# Patient Record
Sex: MALE | Race: WHITE | NOT HISPANIC OR LATINO | Employment: FULL TIME | URBAN - METROPOLITAN AREA
[De-identification: names, ages, dates, MRNs, and addresses within clinical notes are randomized per-mention and may not be internally consistent; named-entity substitution may affect disease eponyms.]

---

## 2022-01-01 ENCOUNTER — HOSPITAL ENCOUNTER (EMERGENCY)
Facility: CLINIC | Age: 52
Discharge: HOME | End: 2022-01-01
Attending: EMERGENCY MEDICINE | Admitting: EMERGENCY MEDICINE
Payer: COMMERCIAL

## 2022-01-01 ENCOUNTER — APPOINTMENT (OUTPATIENT)
Dept: GENERAL RADIOLOGY | Facility: CLINIC | Age: 52
End: 2022-01-01
Attending: EMERGENCY MEDICINE
Payer: COMMERCIAL

## 2022-01-01 VITALS
WEIGHT: 171.96 LBS | HEART RATE: 80 BPM | RESPIRATION RATE: 20 BRPM | OXYGEN SATURATION: 100 % | SYSTOLIC BLOOD PRESSURE: 136 MMHG | DIASTOLIC BLOOD PRESSURE: 82 MMHG | TEMPERATURE: 98.1 F | HEIGHT: 69 IN | BODY MASS INDEX: 25.47 KG/M2

## 2022-01-01 DIAGNOSIS — S93.491A SPRAIN OF ANTERIOR TALOFIBULAR LIGAMENT OF RIGHT ANKLE, INITIAL ENCOUNTER: ICD-10-CM

## 2022-01-01 PROCEDURE — 99283 EMERGENCY DEPT VISIT LOW MDM: CPT | Performed by: EMERGENCY MEDICINE

## 2022-01-01 PROCEDURE — 99283 EMERGENCY DEPT VISIT LOW MDM: CPT

## 2022-01-01 PROCEDURE — 73610 X-RAY EXAM OF ANKLE: CPT | Mod: 26 | Performed by: RADIOLOGY

## 2022-01-01 PROCEDURE — 73610 X-RAY EXAM OF ANKLE: CPT | Mod: RT

## 2022-01-01 RX ORDER — IBUPROFEN 200 MG
600 TABLET ORAL 3 TIMES DAILY
Qty: 45 TABLET | Refills: 0 | Status: SHIPPED | OUTPATIENT
Start: 2022-01-01 | End: 2022-01-06

## 2022-01-01 ASSESSMENT — ENCOUNTER SYMPTOMS
HEADACHES: 0
BACK PAIN: 0

## 2022-01-01 ASSESSMENT — MIFFLIN-ST. JEOR: SCORE: 1624.99

## 2022-01-01 NOTE — ED PROVIDER NOTES
"    [place] EMERGENCY DEPARTMENT ([facility])  1/01/22  History     Chief Complaint   Patient presents with     Ankle Pain     The history is provided by the patient and medical records.     Marck Orantes is a 51 year old male who states that he fell down some steps landing on his right foot and twisting his right ankle.  Patient states he had immediate pain and had difficulty weightbearing on his ankle and presents the ER with complaints of swelling and pain in the lateral aspect of his ankle.  Patient denies any head neck or back injury.    This part of the medical record was transcribed by Ethel Chan, Medical Scribe, from a dictation done by Micheal Gonsalez MD.     No past medical history on file.    No past surgical history on file.    No family history on file.    Social History     Tobacco Use     Smoking status: Never Smoker     Smokeless tobacco: Never Used   Substance Use Topics     Alcohol use: Never        Allergies   Allergen Reactions     Lidocaine         Review of your medicines      None.       I have reviewed the Medications, Allergies, Past Medical and Surgical History, and Social History in the Epic system.    Review of Systems   Musculoskeletal: Negative for back pain.        Positive for R ankle pain and swelling   Neurological: Negative for headaches.   All other systems reviewed and are negative.    A complete review of systems was performed with pertinent positives and negatives noted in the HPI, and all other systems negative.    Physical Exam   BP: 125/67  Pulse: 89  Temp: 98.1  F (36.7  C)  Resp: 15  Height: 176 cm (5' 9.29\")  Weight: 78 kg (171 lb 15.3 oz)  SpO2: 100 %      Physical Exam  Vitals and nursing note reviewed.   Constitutional:       Appearance: He is not diaphoretic.   HENT:      Head: Atraumatic.   Eyes:      Extraocular Movements: Extraocular movements intact.      Pupils: Pupils are equal, round, and reactive to light.   Musculoskeletal:      Cervical back: " Neck supple.      Comments: Patient has swelling and bony tenderness of the lateral malleolus of the right ankle there is no proximal tib-fib tenderness.  Distal CMS is intact.  There is no foot bony tenderness.  There is no mortise instability.   Neurological:      General: No focal deficit present.      Mental Status: He is alert and oriented to person, place, and time.   Psychiatric:         Mood and Affect: Mood normal.         ED Course     At 4:06 PM the patient was seen and examined by Micheal Gonsalez MD in Room ED09.        Procedures         Results for orders placed or performed during the hospital encounter of 01/01/22 (from the past 24 hour(s))   Ankle XR, G/E 3 views, right    Narrative    EXAM: XR ANKLE RIGHT G/E 3 VIEWS  LOCATION: [facility]  DATE/TIME: 1/1/2022 4:09 PM    INDICATION: Right ankle pain after trauma.  COMPARISON: None.      Impression    IMPRESSION: Acute nondisplaced small avulsion fracture of the inferior tip of the fibula, with fracture fragment measuring 8 mm. No other fracture. Normal joint alignment. The ankle mortise is symmetric. Normal joint spacing. Mild circumferential soft   tissue swelling throughout the right ankle, lateral greater than medial.       Assessments & Plan (with Medical Decision Making)     I have reviewed the nursing notes.    I have reviewed the findings, diagnosis, plan and need for follow up with the patient.    New Prescriptions    IBUPROFEN (ADVIL/MOTRIN) 200 MG TABLET    Take 3 tablets (600 mg) by mouth 3 times daily for 5 days       Final diagnoses:   Sprain of anterior talofibular ligament of right ankle, initial encounter - with possible small avulsion fracture of tip of fibula     Ice and elevation tonight    Wear ankle brace for 1 week to provide stabilization for ambulation    Use crutches over the next 3 days and gradually increase weightbearing as tolerated with the ankle brace on.    Please  make an appointment to follow up with Your Primary Care Provider or our Orthopedics Clinic (phone: [phone]) in 1 week for recheck as needed.      I, Ethel Chan am serving as a trained medical scribe to document services personally performed by Micheal Gonsalez MD, based on the provider's statements to me.      I, Micheal Gonsalez MD, was physically present and have reviewed and verified the accuracy of this note documented by Ethel Chan.         Micheal Gonsalez MD  1/1/2022   [facility] EMERGENCY DEPARTMENT     Micheal Gonsalez MD  01/01/22 9983

## 2022-01-01 NOTE — DISCHARGE INSTRUCTIONS
Ice and elevation tonight    Wear ankle brace for 1 week to provide stabilization for ambulation    Use crutches over the next 3 days and gradually increase weightbearing as tolerated with the ankle brace on.    Please make an appointment to follow up with Your Primary Care Provider or our Orthopedics Clinic (phone: [phone]) in 1 week for recheck as needed.

## 2022-05-13 VITALS
HEART RATE: 87 BPM | RESPIRATION RATE: 16 BRPM | BODY MASS INDEX: 24.89 KG/M2 | SYSTOLIC BLOOD PRESSURE: 155 MMHG | OXYGEN SATURATION: 100 % | WEIGHT: 170 LBS | TEMPERATURE: 98.2 F | DIASTOLIC BLOOD PRESSURE: 97 MMHG

## 2022-05-13 PROCEDURE — 99285 EMERGENCY DEPT VISIT HI MDM: CPT | Mod: 25 | Performed by: EMERGENCY MEDICINE

## 2022-05-13 PROCEDURE — 26750 TREAT FINGER FRACTURE EACH: CPT | Mod: 54 | Performed by: EMERGENCY MEDICINE

## 2022-05-13 PROCEDURE — 26750 TREAT FINGER FRACTURE EACH: CPT | Mod: F2 | Performed by: EMERGENCY MEDICINE

## 2022-05-14 ENCOUNTER — HOSPITAL ENCOUNTER (EMERGENCY)
Facility: CLINIC | Age: 52
Discharge: HOME | End: 2022-05-14
Attending: EMERGENCY MEDICINE | Admitting: EMERGENCY MEDICINE
Payer: COMMERCIAL

## 2022-05-14 ENCOUNTER — APPOINTMENT (OUTPATIENT)
Dept: GENERAL RADIOLOGY | Facility: CLINIC | Age: 52
End: 2022-05-14
Attending: EMERGENCY MEDICINE
Payer: COMMERCIAL

## 2022-05-14 DIAGNOSIS — S61.313A LACERATION OF LEFT MIDDLE FINGER WITHOUT FOREIGN BODY WITH DAMAGE TO NAIL, INITIAL ENCOUNTER: ICD-10-CM

## 2022-05-14 DIAGNOSIS — S62.639B OPEN FRACTURE OF TUFT OF DISTAL PHALANX OF FINGER: ICD-10-CM

## 2022-05-14 PROCEDURE — 73130 X-RAY EXAM OF HAND: CPT | Mod: 26 | Performed by: RADIOLOGY

## 2022-05-14 PROCEDURE — 250N000013 HC RX MED GY IP 250 OP 250 PS 637: Performed by: EMERGENCY MEDICINE

## 2022-05-14 PROCEDURE — 73130 X-RAY EXAM OF HAND: CPT | Mod: LT

## 2022-05-14 PROCEDURE — 90471 IMMUNIZATION ADMIN: CPT | Performed by: EMERGENCY MEDICINE

## 2022-05-14 PROCEDURE — 90715 TDAP VACCINE 7 YRS/> IM: CPT | Performed by: EMERGENCY MEDICINE

## 2022-05-14 PROCEDURE — 250N000011 HC RX IP 250 OP 636: Performed by: EMERGENCY MEDICINE

## 2022-05-14 PROCEDURE — 96365 THER/PROPH/DIAG IV INF INIT: CPT | Performed by: EMERGENCY MEDICINE

## 2022-05-14 PROCEDURE — 96375 TX/PRO/DX INJ NEW DRUG ADDON: CPT | Performed by: EMERGENCY MEDICINE

## 2022-05-14 PROCEDURE — 96366 THER/PROPH/DIAG IV INF ADDON: CPT | Performed by: EMERGENCY MEDICINE

## 2022-05-14 RX ORDER — BUPIVACAINE HYDROCHLORIDE 2.5 MG/ML
5 INJECTION, SOLUTION EPIDURAL; INFILTRATION; INTRACAUDAL ONCE
Status: DISCONTINUED | OUTPATIENT
Start: 2022-05-14 | End: 2022-05-14 | Stop reason: HOSPADM

## 2022-05-14 RX ORDER — BUPIVACAINE HYDROCHLORIDE 2.5 MG/ML
INJECTION, SOLUTION EPIDURAL; INFILTRATION; INTRACAUDAL
Status: DISCONTINUED
Start: 2022-05-14 | End: 2022-05-14 | Stop reason: HOSPADM

## 2022-05-14 RX ORDER — OXYCODONE HYDROCHLORIDE 5 MG/1
5 TABLET ORAL EVERY 6 HOURS PRN
Qty: 6 TABLET | Refills: 0 | Status: SHIPPED | OUTPATIENT
Start: 2022-05-14 | End: 2022-05-17

## 2022-05-14 RX ORDER — CEPHALEXIN 500 MG/1
500 CAPSULE ORAL 3 TIMES DAILY
Qty: 15 CAPSULE | Refills: 0 | Status: SHIPPED | OUTPATIENT
Start: 2022-05-14 | End: 2022-05-19

## 2022-05-14 RX ORDER — HYDROMORPHONE HYDROCHLORIDE 1 MG/ML
0.5 INJECTION, SOLUTION INTRAMUSCULAR; INTRAVENOUS; SUBCUTANEOUS ONCE
Status: COMPLETED | OUTPATIENT
Start: 2022-05-14 | End: 2022-05-14

## 2022-05-14 RX ORDER — CEFTRIAXONE 1 G/1
1 INJECTION, POWDER, FOR SOLUTION INTRAMUSCULAR; INTRAVENOUS ONCE
Status: COMPLETED | OUTPATIENT
Start: 2022-05-14 | End: 2022-05-14

## 2022-05-14 RX ORDER — OXYCODONE HYDROCHLORIDE 5 MG/1
5 TABLET ORAL ONCE
Status: COMPLETED | OUTPATIENT
Start: 2022-05-14 | End: 2022-05-14

## 2022-05-14 RX ORDER — IBUPROFEN 600 MG/1
600 TABLET, FILM COATED ORAL ONCE
Status: COMPLETED | OUTPATIENT
Start: 2022-05-14 | End: 2022-05-14

## 2022-05-14 RX ADMIN — OXYCODONE HYDROCHLORIDE 5 MG: 5 TABLET ORAL at 06:04

## 2022-05-14 RX ADMIN — CLOSTRIDIUM TETANI TOXOID ANTIGEN (FORMALDEHYDE INACTIVATED), CORYNEBACTERIUM DIPHTHERIAE TOXOID ANTIGEN (FORMALDEHYDE INACTIVATED), BORDETELLA PERTUSSIS TOXOID ANTIGEN (GLUTARALDEHYDE INACTIVATED), BORDETELLA PERTUSSIS FILAMENTOUS HEMAGGLUTININ ANTIGEN (FORMALDEHYDE INACTIVATED), BORDETELLA PERTUSSIS PERTACTIN ANTIGEN, AND BORDETELLA PERTUSSIS FIMBRIAE 2/3 ANTIGEN 0.5 ML: 5; 2; 2.5; 5; 3; 5 INJECTION, SUSPENSION INTRAMUSCULAR at 03:39

## 2022-05-14 RX ADMIN — IBUPROFEN 600 MG: 600 TABLET ORAL at 06:04

## 2022-05-14 RX ADMIN — HYDROMORPHONE HYDROCHLORIDE 0.5 MG: 1 INJECTION, SOLUTION INTRAMUSCULAR; INTRAVENOUS; SUBCUTANEOUS at 03:40

## 2022-05-14 RX ADMIN — CEFTRIAXONE 1 G: 1 INJECTION, POWDER, FOR SOLUTION INTRAMUSCULAR; INTRAVENOUS at 03:40

## 2022-05-14 ASSESSMENT — ENCOUNTER SYMPTOMS
BRUISES/BLEEDS EASILY: 0
CONFUSION: 0
VOMITING: 0
SHORTNESS OF BREATH: 0
BACK PAIN: 0
DYSURIA: 0
HEADACHES: 0
NAUSEA: 0
COUGH: 0
FEVER: 0
WEAKNESS: 0
ABDOMINAL PAIN: 0
RHINORRHEA: 0

## 2022-05-14 NOTE — DISCHARGE INSTRUCTIONS
Thank you for your patience today.  Please follow-up with your regular doctor in the next 2-3 days for further evaluation and follow-up care.  Please call to schedule an appointment.  Please follow-up with orthopedic/hand.  A referral has been placed so they should call you Monday to schedule an appointment.  If you do not hear from them please call the clinic to schedule a follow-up appointment.  [phone]    Please continue your own medications.  Please take antibiotics as directed.  Please keep wounds clean and dry.  Please return to the ER if you develop any worsening of your current symptoms.  It was a pleasure taking care of you today.  We hope you feel better soon.

## 2022-05-14 NOTE — ED PROVIDER NOTES
ED Provider Note  [facility]      History     Chief Complaint   Patient presents with     Hand Injury     HPI  aMrck Orantes is a 51 year old right-handed male who has no significant past medical history who presents to the emergency department for evaluation of a hand injury.  Patient states that approximately 10 to 15 minutes prior to arrival he was cutting wood with an electrical saw and cut his left fingers.  Patient reports that he lacerated his third digit and has some minor injuries to his second and fourth digit.  Patient reports some tingling and numbness to his left third digit.  Patient denies any other injury or complaints.  Patient is not on chronic anticoagulation.  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  Last tetanus was greater than 10 years ago.    Past Medical History  History reviewed. No pertinent past medical history.  History reviewed. No pertinent surgical history.  cephALEXin (KEFLEX) 500 MG capsule      Allergies   Allergen Reactions     Lidocaine      Family History  History reviewed. No pertinent family history.  Social History   Social History     Tobacco Use     Smoking status: Never Smoker     Smokeless tobacco: Never Used   Substance Use Topics     Alcohol use: Never     Drug use: Never      Past medical history, past surgical history, medications, allergies, family history, and social history were reviewed with the patient. No additional pertinent items.       Review of Systems   Constitutional: Negative for fever.   HENT: Negative for rhinorrhea.    Eyes: Negative for visual disturbance.   Respiratory: Negative for cough and shortness of breath.    Cardiovascular: Negative for chest pain and leg swelling.   Gastrointestinal: Negative for abdominal pain, nausea and vomiting.   Endocrine: Negative for polyuria.   Genitourinary: Negative for dysuria.    Musculoskeletal: Negative for back pain.   Skin: Negative for rash.        +hand/finger tip injury/laceration   Allergic/Immunologic: Negative for immunocompromised state.   Neurological: Negative for weakness and headaches.   Hematological: Does not bruise/bleed easily.   Psychiatric/Behavioral: Negative for confusion.     A complete review of systems was performed with pertinent positives and negatives noted in the HPI, and all other systems negative.    Physical Exam   BP: (!) 155/97  Pulse: 87  Temp: 98.2  F (36.8  C)  Resp: 16  Weight: 77.1 kg (170 lb)  SpO2: 100 %  Physical Exam  General: Afebrile, no acute distress   HEENT: Normocephalic, atraumatic, conjunctivae normal. MMM  Neck: non-tender, supple  Cardio: regular rate. regular rhythm   Resp: Normal work of breathing, no respiratory distress, lungs clear bilaterally, no wheezing, rhonchi, rales  Chest/Back: no visual signs of trauma, no CVA tenderness   Abdomen: soft, non distension, no tenderness, no peritoneal signs   Neuro: alert and fully oriented. CN II-XII grossly intact. Grossly normal strength and sensation in all extremities.   MSK:  no deformities. Normal range of motion  Integumentary/Skin: Left hand with superficial laceration: 2nd digit with superficial laceration/tear at distal aspect of nail/phalanx, 3rd digit with ~2 cm flap like laceration at distal phalanx with nailbed involvement, 4th digit with ~1 cm laceration/tear at distal phalanx, bleeding controlled   Psych: normal affect, normal behavior    ED Course      Procedures  Laceration repair:  Confirmed Patient, procedure, side, and site correct, time out taken prior to procedure  verbal consent  Laceration size and location: avulsion/flap like laceration left 3 digit ~ 2 cm  Shape: flap   Depth: Superficial  Details: Clean  Neurovascular/tendon exam: Intact  Anesthesia: None (patient allergic)  Preparation: sterile field established  Irrigation: copious, with saline  Debridement:  none  Skin Closure: Sutures/staples:  simple interrupted technique, total of 3 sutures, 5-0 prolene  Complexity: single layer  post procedure exam: circulation, motor, sensory examination intact  complications: none  patient tolerated well  performed by self  total time 30 minutes      .   Results for orders placed or performed during the hospital encounter of 05/14/22   XR Hand Left G/E 3 Views     Status: None    Narrative    EXAM: XR HAND LT G/E 3 VW  LOCATION: [facility]  DATE/TIME: 5/14/2022 12:27 AM    INDICATION: high speed saw injury to left 3rd middle 4th ring fingers  COMPARISON: None.      Impression    IMPRESSION: There is a slightly displaced comminuted fracture involving the tuft of the third finger extending to the base and there is a subtle lucency involving the base of the fourth finger tuft which likely represents a nondisplaced fracture.     Medications   bupivacaine (MARCAINE) 0.25% preservative free injection (has no administration in time range)   bupivacaine HCl (PF) (MARCAINE) 0.25 % injection (has no administration in time range)   ibuprofen (ADVIL/MOTRIN) tablet 600 mg (has no administration in time range)   oxyCODONE (ROXICODONE) tablet 5 mg (has no administration in time range)   cefTRIAXone (ROCEPHIN) 1 g vial to attach to  mL bag for ADULTS or NS 50 mL bag for PEDS (1 g Intravenous New Bag 5/14/22 0340)   Tdap (tetanus-diphtheria-acell pertussis) (ADACEL) injection 0.5 mL (0.5 mLs Intramuscular Given 5/14/22 0339)   HYDROmorphone (PF) (DILAUDID) injection 0.5 mg (0.5 mg Intravenous Given 5/14/22 0340)        Assessments & Plan (with Medical Decision Making)   Marck Orantes is a 51 year old right-handed male who has no significant past medical history who presents to the emergency department for evaluation of a hand injury.  Upon arrival patient is anxious but otherwise well-appearing, afebrile, mild distress secondary to injury.  I  reviewed x-ray which was performed in triage which demonstrates a slightly displaced comminuted fracture involving the tuft of the third finger extending to the base with a subtle lucency involving the base of the fourth finger tuft which likely represents a nondisplaced fracture.  Wound was irrigated, explored, and wound to his left third digit was loosely closed with a total of 3 sutures.  Dressing applied to all wounds.    Patient requesting a dose of IV ceftriaxone in the emergency department to prevent infection.  1 g of IV ceftriaxone given in the emergency department.  Will send patient home with a course of Keflex.  Orthopedic referral placed for close follow-up, wound care instructions discussed.  Patient understands and agrees with plan.    I have reviewed the nursing notes. I have reviewed the findings, diagnosis, plan and need for follow up with the patient.    New Prescriptions    CEPHALEXIN (KEFLEX) 500 MG CAPSULE    Take 1 capsule (500 mg) by mouth 3 times daily for 5 days       Final diagnoses:   Open fracture of tuft of distal phalanx of finger   Laceration of left middle finger without foreign body with damage to nail, initial encounter       --  Sophy Gonsalez MD  [facility] EMERGENCY DEPARTMENT  5/13/2022     Sophy Gonsalez MD  05/14/22 2230

## 2022-05-14 NOTE — ED TRIAGE NOTES
Patient was using saw when he lacerated his third digit on his left hand. Bleeding controlled; applied gauze and coban.      Triage Assessment     Row Name 05/13/22 1987       Triage Assessment (Adult)    Airway WDL WDL       Respiratory WDL    Respiratory WDL WDL       Skin Circulation/Temperature WDL    Skin Circulation/Temperature WDL WDL       Cardiac WDL    Cardiac WDL WDL       Peripheral/Neurovascular WDL    Peripheral Neurovascular WDL WDL       Cognitive/Neuro/Behavioral WDL    Cognitive/Neuro/Behavioral WDL WDL

## 2022-05-16 ENCOUNTER — TELEPHONE (OUTPATIENT)
Dept: ORTHOPEDICS | Facility: CLINIC | Age: 52
End: 2022-05-16
Payer: COMMERCIAL

## 2022-05-16 NOTE — TELEPHONE ENCOUNTER
[facility] Call Center    Phone Message    May a detailed message be left on voicemail: yes     Reason for Call: Other: Patient is requesting to get an appt sooner then 5/20 states he has a open wound. Please call patient regarding this     Action Taken: Other: [facility]    Travel Screening: Not Applicable

## 2022-05-16 NOTE — TELEPHONE ENCOUNTER
ATC called patient multiple times regarding his next follow-up.  Left a VM for call back.  Please schedule patient with MAKSIM Gallagher, this Friday when patient calls.            -CATRACHO Estes- Orthopedics

## 2022-05-17 NOTE — TELEPHONE ENCOUNTER
ATC called and spoke to pt. ATC stated that she spoke to A[facility] EAT[facility] care team and they are comfortable with this patient waiting till Friday 5/20. Pt confirmed understanding. ATC encouraged pt to keep wound covered and clean until appt. Pt confirmed understanding.     Roxana Galan ATC